# Patient Record
Sex: MALE | Race: WHITE | NOT HISPANIC OR LATINO | URBAN - METROPOLITAN AREA
[De-identification: names, ages, dates, MRNs, and addresses within clinical notes are randomized per-mention and may not be internally consistent; named-entity substitution may affect disease eponyms.]

---

## 2023-06-30 ENCOUNTER — HOSPITAL ENCOUNTER (OUTPATIENT)
Dept: DATA CONVERSION | Facility: HOSPITAL | Age: 48
End: 2023-06-30
Attending: PODIATRIST | Admitting: PODIATRIST

## 2023-06-30 DIAGNOSIS — L03.116 CELLULITIS OF LEFT LOWER LIMB: ICD-10-CM

## 2023-06-30 DIAGNOSIS — E11.621 TYPE 2 DIABETES MELLITUS WITH FOOT ULCER (CODE) (MULTI): ICD-10-CM

## 2023-06-30 DIAGNOSIS — E11.628 TYPE 2 DIABETES MELLITUS WITH OTHER SKIN COMPLICATIONS (MULTI): ICD-10-CM

## 2023-06-30 DIAGNOSIS — Z79.4 LONG TERM (CURRENT) USE OF INSULIN (MULTI): ICD-10-CM

## 2023-06-30 DIAGNOSIS — L97.529 NON-PRESSURE CHRONIC ULCER OF OTHER PART OF LEFT FOOT WITH UNSPECIFIED SEVERITY (MULTI): ICD-10-CM

## 2023-06-30 DIAGNOSIS — E66.01 MORBID (SEVERE) OBESITY DUE TO EXCESS CALORIES (MULTI): ICD-10-CM

## 2023-06-30 DIAGNOSIS — E11.51 TYPE 2 DIABETES MELLITUS WITH DIABETIC PERIPHERAL ANGIOPATHY WITHOUT GANGRENE (MULTI): ICD-10-CM

## 2023-06-30 DIAGNOSIS — M86.172 OTHER ACUTE OSTEOMYELITIS, LEFT ANKLE AND FOOT (MULTI): ICD-10-CM

## 2023-06-30 DIAGNOSIS — E11.40 TYPE 2 DIABETES MELLITUS WITH DIABETIC NEUROPATHY, UNSPECIFIED (MULTI): ICD-10-CM

## 2023-06-30 DIAGNOSIS — Z89.511 ACQUIRED ABSENCE OF RIGHT LEG BELOW KNEE (MULTI): ICD-10-CM

## 2023-06-30 DIAGNOSIS — M86.672 OTHER CHRONIC OSTEOMYELITIS, LEFT ANKLE AND FOOT (MULTI): ICD-10-CM

## 2023-07-03 LAB
GRAM STAIN: ABNORMAL
TISSUE/WOUND CULTURE/SMEAR: ABNORMAL

## 2023-09-29 VITALS — WEIGHT: 249.12 LBS

## 2023-09-30 NOTE — H&P
History & Physical Reviewed:   I have reviewed the History and Physical dated:  30-Jun-2023   History and Physical reviewed and relevant findings noted. Patient examined to review pertinent physical  findings.: No significant changes   Home Medications Reviewed: no changes noted   Allergies Reviewed: no changes noted       ERAS (Enhanced Recovery After Surgery):  ·  ERAS Patient: no     Consent:   COVID-19 Consent:  ·  COVID-19 Risk Consent Surgeon has reviewed key risks related to the risk of ale COVID-19 and if they contract COVID-19 what the risks are.     Attestation:   Note Completion:  I am a:  Resident/Fellow   Attending Attestation I saw and evaluated the patient.  I personally obtained the key and critical portions of the history and physical exam or was physically present for key and  critical portions performed by the resident/fellow. I reviewed the resident/fellow?s documentation and discussed the patient with the resident/fellow.  I agree with the resident/fellow?s medical decision making as documented in the note.     I personally evaluated the patient on 30-Jun-2023         Electronic Signatures:  Richi Voss (DPM (Resident))  (Signed 30-Jun-2023 13:34)   Authored: History & Physical Reviewed, ERAS, Consent,  Note Completion  Leatha Blas (DP)  (Signed 30-Jun-2023 13:53)   Authored: Note Completion   Co-Signer: History & Physical Reviewed, ERAS, Consent, Note Completion      Last Updated: 30-Jun-2023 13:53 by Leatha Blas ([facility])

## 2023-10-02 NOTE — OP NOTE
PROCEDURE DETAILS    Preoperative Diagnosis:  Other acute osteomyelitis, left ankle and foot, M86.172  Cellulitis of left lower leg, L03.116    Postoperative Diagnosis:  Left foot ulcer down to bone    Surgeon: Leatha Blas  Resident/Fellow/Other Assistant: Richi Voss     Procedure:  1. **LOCAL**LEFT FOOT DEBRIDEMENT    Anesthesia: MAC  Estimated Blood Loss: 5 mL  Blood Replaced: LR  Findings: Full thickness ulcer with exposed bone  Specimens(s) Collected: no,     Complications: None  IV Fluids: LR  Patient Returned To/Condition: PACU/stable                                Attestation:   Note Completion:  Attending Attestation I was present for the entire procedure    I am a: Resident/Fellow         Electronic Signatures:  Richi Voss (FRIDA (Resident))  (Signed 30-Jun-2023 14:35)   Authored: Post-Operative Note, Chart Review, Note Completion  Leatha Blas)  (Signed 05-Jul-2023 14:51)   Authored: Note Completion   Co-Signer: Post-Operative Note, Chart Review, Note Completion      Last Updated: 05-Jul-2023 14:51 by Leatha Blas (FRIDA)

## 2024-05-06 NOTE — OP NOTE
SURGEON:  Leatha Blas DPM    PREOPERATIVE DIAGNOSES:    Diabetic ulcer grade 3, left foot with chronic osteomyelitis.  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     POSTOPERATIVE DIAGNOSES:    Diabetic ulcer grade 3, left foot with chronic osteomyelitis.  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  Pending cultures.     PROCEDURES:    Wide excisional debridement of plantar left foot with Misonix.  Debridement of multiple metatarsal bones.     ANESTHESIA TYPE:    Local.    ASSISTANT:    Per resident.    INDICATIONS:    This is a 48-year-old white male, well known to me, has extremely  complex and complicated history including BKA, right; multiple  debridements, amputations, left foot.  He is known to me from  [facility] where he underwent a radical debridement several  weeks ago.  Delayed primary closure was done.  The wound was  completely failed and opened.  He has severe neuropathy, peripheral  vascular disease, uncontrolled diabetes, morbid obesity, psychiatric  and social problems as well as diabetic neuropathy.  He is at high  risk for BKA left and he is aware of that.  We are doing all we can  as at attempts at limb salvage.  He is aware of the risks.  He is  also heavy smoker, which is complicating his healing.     PROCEDURE IN DETAIL:    The patient was taken to OR, placed on OR table in supine position.  A local block left ankle was performed, 2% lidocaine plain.  The  plantar ulcer 6 x 8 cm, plantar left underneath 5th metatarsal was  identified.  It probes to bone.  All nonviable tissue was debrided  with sharp tissue nipper.  Deep culture was taken.  Then, utilizing  Misonix debrider, extensive excisional debridement performed through  the subfascial layer, removing all nonviable fascia and debridement  of bone with several portions of the cuboid were debrided as well.  This was all done with a Misonix.  After copious  debridement, the  wound was then flushed and packed with Silvercel, dry sterile  dressing and a compression dressing left leg was applied.     The patient taken to recovery room, vital signs stable, in good  condition.  I will see him in several days in [facility].  He  is currently on oral antibiotic per his Infectious Disease, Vian for  pain.       Leatha Blas DPM    DD:  06/30/2023 14:12:30 EST  DT:  06/30/2023 15:20:41 EST  DICTATION NUMBER:  [id number]  INTERNAL JOB NUMBER:  [id number]             Electronic Signatures:  Leatha Blas (FRIDA) (Signed on 05-Jul-2023 13:36)   Authored  Unsigned, Draft (SYS GENERATED) (Entered on 30-Jun-2023 15:20)   Entered    Last Updated: 05-Jul-2023 13:36 by Leatha Blas (FRIDA)